# Patient Record
Sex: MALE | Race: WHITE | NOT HISPANIC OR LATINO | Employment: FULL TIME | URBAN - METROPOLITAN AREA
[De-identification: names, ages, dates, MRNs, and addresses within clinical notes are randomized per-mention and may not be internally consistent; named-entity substitution may affect disease eponyms.]

---

## 2018-07-11 ENCOUNTER — OFFICE VISIT CONVERTED (OUTPATIENT)
Dept: ONCOLOGY | Facility: HOSPITAL | Age: 53
End: 2018-07-11
Attending: INTERNAL MEDICINE

## 2018-08-30 ENCOUNTER — OFFICE VISIT CONVERTED (OUTPATIENT)
Dept: ONCOLOGY | Facility: HOSPITAL | Age: 53
End: 2018-08-30
Attending: INTERNAL MEDICINE

## 2021-05-28 VITALS
BODY MASS INDEX: 37.87 KG/M2 | SYSTOLIC BLOOD PRESSURE: 153 MMHG | HEART RATE: 61 BPM | WEIGHT: 270.5 LBS | RESPIRATION RATE: 16 BRPM | HEIGHT: 71 IN | TEMPERATURE: 97.3 F | OXYGEN SATURATION: 97 % | DIASTOLIC BLOOD PRESSURE: 94 MMHG

## 2021-05-28 VITALS
SYSTOLIC BLOOD PRESSURE: 124 MMHG | HEIGHT: 71 IN | TEMPERATURE: 98.2 F | BODY MASS INDEX: 38.58 KG/M2 | DIASTOLIC BLOOD PRESSURE: 70 MMHG | OXYGEN SATURATION: 97 % | HEART RATE: 102 BPM | WEIGHT: 275.57 LBS

## 2021-05-28 NOTE — PROGRESS NOTES
Patient: CRISTIANO ARGUETA     Acct: [id number]     Report: #[id number]  UNIT #: [id number]     : 1965    Encounter Date:2018  PRIMARY CARE: SALAZAR MORRISON  ***Signed***  --------------------------------------------------------------------------------------------------------------------  Visit Type      Established Patient Visit            Chief Complaint      COLON CA            Referring Provider/Copies To      Referring Provider:  Annette Zaldivar            Allergies      Coded Allergies:             NO KNOWN ALLERGIES (Unverified , 18)            Medications      Last Reconciled on 18 16:30 by HAKEEM ROSSI MD      Levothyroxine (Synthroid) 0.025 Mg      0.025 MG PO QDAY@07, #30 TAB 0 Refills         Reported         18       Levothyroxine (Synthroid) 0.2 Mg      0.2 MG PO QDAY@07, #30 TAB 0 Refills         Reported         18       Doxazosin Mesylate (Doxazosin Mesylate) 4 Mg Tablet      4 MG PO QDAY, TAB         Reported         18       Simvastatin (Simvastatin*) 20 Mg Tablet      20 MG PO QDAY, #30 TAB 0 Refills         Reported         18       Lisinopril* (Lisinopril*) 40 Mg Tablet      40 MG PO QDAY, #30 TAB 0 Refills         Reported         18      Medications Reviewed:  No Changes made to meds            History and Present Illness      Past Oncology Illness History      Stage 0 (Tis, N0, M0) adenocarcinoma identified in a polyp. 18 Status post     sigmoidectomy without residual cancer identified. 0/12 lymph nodes involved. No     adjuvant therapy required            HPI - Oncology Interim      Sigmoidectomy on 18 by Dr. Dolan 52-year-old white male who returns today     for follow-up of hemicolectomy. He had undergone colon cancer screening     colonoscopy which identified an abnormal polyp. Biopsy of the area did show     carcinoma invading the mucosa but not into the submucosa. Margins could not be     reliably identified as the specimen was  morselized. He was referred to     [facility] and underwent sigmoidectomy on 8/9/18 by Dr. Mccullough..     This revealed no residual carcinoma and 0/12 lymph nodes involved. No adjuvant     therapy required.      On return today, patient states that he is feeling well. He has one small area     of irritation in the umbilical scar with some slight drainage but otherwise his     surgical incisions are healing well. He denies abdominal or pelvic pain. He is     eating and drinking normally, his weight is maintained. He reports that the     bowels are back to normal. He notes that his energy level is not quite back to     normal but improving daily. He is trying to exercise some. He denies new masses     or lymphadenopathy. Overall he is feeling well.            Cancer Details            Stage 0 (Tis, N0, M0) adenocarcinoma identified in a polyp. Status post      hemicolectomy without residual cancer identified. 0/12 lymph nodes involved            PAST, FAMILY   Past Medical History      Past Medical History:  No Diabetes Type 1, No Diabetes Type 2; Thyroid Disease;     No COPD, No Emphysema; Hypertension; No Stroke; High Cholesterol; No Heart     Attack, No Bleeding Condition, No Low or High RBC Count, No Low or High WBC     Count, No Low or High Platelet Coun, No Hepatitis, No Kidney Disease, No     Depression, No Alzheimer's Disease, No Mental Disease, No Seizures, No     Arthritis, No Osteoporosis, No Osteopenia, No Short of Air, No Sleep apnea, No     Liver Disease, No STD, No Enlarged Prostate, No Other      Hematology/oncology:  REPORTS HX OF: Colorectal cancer; DENIES HX OF: Previous     Treatment for CA, Anemia, Bladder Cancer, Blood cancer, Brain cancer, Breast     cancer, Coagulopathy, Colon Cancer, Endocrine cancer, Eye cancer, GI cancer,      cancer, Kidney cancer, Leukemia, Leukocytosis, Leukopenia, Liver cancer, Lung     cancer, Lymphoma, Musculoskeletal cancer, Myeloma, Neurologic cancer,  Oral     cancer, Pancreatic Cancer, Prostate cancer, Skin cancer, Stomach cancer,     Testicular cancer, Thrombocytopenia, Thyroid cancer, Other cancer history, Other    hematologic history      Genetic/metabolic:  DENIES HX OF: Cystic fibrosis, Down syndrome, Other genetic     history, Other metabolic history            Past Surgical History      REPORTS HX OF: Biopsy, Other Past Surgical Hx (HAND, SHOULDER, AND BACK); DENIES    HX OF: Cataract extraction, Thyroid surgery, Lung biopsy, CABG surgery, Coronary    stent, Valve replacement, Appendectomy, Cholecystectomy, Splenectomy, Bladder     surgery, Nephrectomy, Joint replacement, Frature repair, Skin cancer removal,     Melanoma excision, Spinal surgery, Breast biopsy, Lumpectomy, Mastectomy,     bilateral, Mastectomy, right, Mastectomy, left, Hysterectomy, Peg Tube     Placement, VAD Placement            Family History      REPORTS HX OF: Breast cancer (GRANDMOTHER), Colorectal cancer (UNCLE)            Social History      Lives independently:  No            Tobacco Use      Tobacco status:  Current every day smoker      Smoking packs/day:  1      Quit status:  Considering quitting            Alcohol Use      Alcohol intake:  None            Substance Use      Substance use:  Denies use            REVIEW OF SYSTEMS      General:  Denies: Appetite change, Excessive sweating, Fatigue, Fever, Night     sweats, Weight gain, Weight loss, Other      Eyes:  Denies: Blurred vision, Corrective lenses, Diplopia, Eye irritation, Eye     pain, Eye redness, Spots in vision, Vision loss, Other      Ears, nose, mouth, throat:  Denies: Headache, Seizures, Visual Changes, Hearing     loss, Sinus Congestion, Hoarseness, Sore throat, Other      Cardiovascular:  Denies: Chest pain, Irregular heartbeat, Palpitations, Swollen     ankles/legs, Other      Respiratory:  Denies: Chest pain, Shortness of Air, Productive cough, Coughing     blood, Other      Gastrointestinal:  Denies:  Nausea, Vomiting, Problem swallowing, Frequent     heartburn, Constipation, Diarrhea, Tarry stools, Bloody stools, Unable to     control bowels, Other      Kidney/Bladder:  Denies: Painful Urination, Blood in Urine, Incontinence,     Frequent Urination, Decreased Urine Stream, Other      Musculoskeletal:  Denies: New Back pain, Leg Cramps, Painful Joints, Swollen     Joints, Muscle Pain, Muscle weakness, Other      Skin:  DENIES: Bruises Easily, Hair changes, Lesions/changes in moles, Nail     changes, Pigment changes, Rash, Other      Neurological:  Denies: Dizziness, Fainting, Numbness\Tingling, Paralysis, Seizur    es, Other      Psychiatric:  Complains of: AAO X 3; Denies: Anxiety, Panic attacks, Depression,    Memory loss, Other      Endocrine:  Denies Thyroid DisorderDiabetesOsteoporosisEndocrine Other      Hematologic/lymphatic:  Denies: Bruising, Bleeding, Enlarged Lymph Nodes,     Recurrent infections, Other            VITAL SIGNS,PAIN/FATIGUE SCORE      Vitals      Height 5 ft 10.59 in / 179.3 cm      Weight 270 lbs 8.071 oz / 122.7 kg      BSA 2.52 m2      BMI 38.2 kg/m2      Temperature 97.3 F / 36.28 C - Temporal      Pulse 61      Respirations 16      Blood Pressure 153/94 Sitting, Left Arm      Pulse Oximetry 97%, RM AIR            Pain Score      Experiencing any pain?:  No            Fatigue Score      Experiencing any fatigue?:  No            General Appearance:  Alert, Oriented X3, Cooperative, No acute distress      Respiratory:  CTAB, Normal Respiratory Effort      Abdomen\Gastro:  Soft, No NABS, Other (healing surgical incisions with one small    area of erythema and scant clear drainage at the umbilicus); No Masses, No     Hepatosplenomegaly      Cardio:  RRR, No Murmur, No, Peripheral Edema      Psychiatric:  Appropriate Affect, Intact Judgement            PREVENTION      Hx Influenza Vaccination:  No      Influenza Vaccine Declined:  No      2 or More Falls Past Year?:  No      Fall Past  Year with Injury?:  No      Hx Pneumococcal Vaccination:  No      Encouraged to follow-up with:  PCP regarding preventative exams.      Chart initiated by      YIMI TOM MA            IMPRESSION/PLAN      Impression      Colon carcinoma in situ (Tis, N0, M0). Status post sigmoidectomy on 8/9/18. No     adjuvant therapy required            Diagnosis      Neoplasm of colon, primary tumor staging category Tis: carcinoma in situ:     intraepithelial or invasion of lamina propria      Patient's initial pathology showed involvement of the mucosa but not into the     submucosa, formal resection failed to demonstrate any residual carcinoma and 12     lymph nodes were negative for involvement. Patient does not require adjuvant     therapy. Based on the NCCN guidelines, observation is appropriate. I will see     him back in 6 months to ensure that he is completely recuperated from his     surgery with basic lab work. He'll need colonoscopy at the 1 year anniversary of    his surgery. Additional scans or labs based on symptoms.            Patient Education      Patient Education Provided:  Yes                 Disclaimer: Converted document may not contain table formatting or lab diagrams. Please see [employer] for the authenticated document.

## 2021-05-28 NOTE — PROGRESS NOTES
Patient: CRISTIANO ARGUETA     Acct: [id number]     Report: #[id number]  UNIT #: [id number]     : 1965    Encounter Date:2018  PRIMARY CARE: SALAZAR MORRISON  ***Signed***  --------------------------------------------------------------------------------------------------------------------  NURSE INTAKE      Encounter Date      2018            Providers      Referring Provider:  Annette Zaldivar      Primary Provider:  SALAZAR MORRISON            Visit Type      New Patient Visit            Chief Complaint      COLON CANCER            Allergies      Coded Allergies:             NO KNOWN ALLERGIES (Unverified , 18)            Medications      Last Reconciled on 18 16:30 by HAKEEM ROSSI MD      Levothyroxine (Synthroid) 0.025 Mg      0.025 MG PO QDAY@07, #30 TAB 0 Refills         Reported         18       Levothyroxine (Synthroid) 0.2 Mg      0.2 MG PO QDAY@07, #30 TAB 0 Refills         Reported         18       Doxazosin Mesylate (Doxazosin Mesylate) 4 Mg Tablet      4 MG PO QDAY, TAB         Reported         18       Simvastatin (Simvastatin*) 20 Mg Tablet      20 MG PO QDAY, #30 TAB 0 Refills         Reported         18       Lisinopril* (Lisinopril*) 40 Mg Tablet      40 MG PO QDAY, #30 TAB 0 Refills         Reported         18      Medications Reviewed:  No Changes made to meds            PAST, FAMILY   Past Medical History      Past Medical History:  No Diabetes Type 1, No Diabetes Type 2, Thyroid Disease,     No COPD, No Emphysema, Hypertension, No Stroke, High Cholesterol, No Heart     Attack, No Bleeding Condition, No Low or High RBC Count, No Low or High WBC     Count, No Low or High Platelet Coun, No Hepatitis, No Kidney Disease, No     Depression, No Alzheimer's Disease, No Mental Disease, No Seizures, No Arthritis    , No Osteoporosis, No Osteopenia, No Short of Air, No Sleep apnea, No Liver     Disease, No STD, No Enlarged Prostate, No Other       Hematology/oncology:  REPORTS HX OF: Colorectal cancer, DENIES HX OF: Previous     Treatment for CA, Anemia, Bladder Cancer, Blood cancer, Brain cancer, Breast     cancer, Coagulopathy, Colon Cancer, Endocrine cancer, Eye cancer, GI cancer,      cancer, Kidney cancer, Leukemia, Leukocytosis, Leukopenia, Liver cancer, Lung     cancer, Lymphoma, Musculoskeletal cancer, Myeloma, Neurologic cancer, Oral     cancer, Pancreatic Cancer, Prostate cancer, Skin cancer, Stomach cancer,     Testicular cancer, Thrombocytopenia, Thyroid cancer, Other cancer history,     Other hematologic history      Genetic/metabolic:  DENIES HX OF: Cystic fibrosis, Down syndrome, Other genetic     history, Other metabolic history            Past Surgical History      REPORTS HX OF: Biopsy, Other Past Surgical Hx (HAND, SHOULDER, AND BACK),     DENIES HX OF: Cataract extraction, Thyroid surgery, Lung biopsy, CABG surgery,     Coronary stent, Valve replacement, Appendectomy, Cholecystectomy, Splenectomy,     Bladder surgery, Nephrectomy, Joint replacement, Frature repair, Skin cancer     removal, Melanoma excision, Spinal surgery, Breast biopsy, Lumpectomy,     Mastectomy, bilateral, Mastectomy, right, Mastectomy, left, Hysterectomy, Peg     Tube Placement, VAD Placement            Family History      REPORTS HX OF: Breast cancer (GRANDMOTHER), Colorectal cancer (UNCLE)            Social History      Lives independently:  No            Tobacco Use      Tobacco status:  Current every day smoker      Smoking packs/day:  1      Quit status:  Considering quitting            Alcohol Use      Alcohol intake:  None            Substance Use      Substance use:  Denies use            HISTORY OF PRESENT ILLNESS      History and Physical            Mr. Sr is a very pleasant 52-year-old gentleman who presented with the D     tarry stools because of which he underwent colonoscopy he on 06/25/2018. He was     found to have multiple polyps which were  removed but at 25 cm there was there     was an area of involvement with adenocarcinoma which was well differentiated     and tumor invaded the muscularis mucosae. Deep margin and mucosal margin could     not be assessed due to fragmentation of the specimen. Invasive adenocarcinoma     was arising out of a tubulovillous adenoma. Patient is now scheduled for     hemicolectomy on . In the meantime he also underwent CT scan of the     abdomen and pelvis which was unremarkable for metastatic disease. He was found     to have a mild splenomegaly most likely due to vascular involvement or     thrombosed aneurysm.            Family history is remarkable for colon cancer in fact his uncle  from     metastatic disease. His father had a brain cancer while on immunosuppressive     therapy for a heart transplant.      Patient is a  and he does smoke.            Most Recent Lab Findings      Laboratory Tests      18 13:18            REVIEW OF SYSTEMS      General:  Denies: Appetite change, Excessive sweating, Fatigue, Fever, Night     sweats, Weight gain, Weight loss, Other      Eyes:  Denies: Blurred vision, Corrective lenses, Diplopia, Eye irritation, Eye     pain, Eye redness, Spots in vision, Vision loss, Other      Ears, nose, mouth, throat:  Denies: Headache, Seizures, Visual Changes, Hearing     loss, Sinus Congestion, Hoarseness, Sore throat, Other      Cardiovascular:  Denies: Chest pain, Irregular heartbeat, Palpitations, Swollen     ankles/legs, Other      Respiratory:  Denies: Chest pain, Shortness of Air, Productive cough, Coughing     blood, Other      Gastrointestinal:  Complains of: Constipation, Tarry stools, Bloody stools,     Denies: Nausea, Vomiting, Problem swallowing, Frequent heartburn, Diarrhea,     Unable to control bowels, Other      Kidney/Bladder:  Denies: Painful Urination, Blood in Urine, Incontinence,     Frequent Urination, Decreased Urine Stream, Other       Musculoskeletal:  Denies: New Back pain, Leg Cramps, Painful Joints, Swollen     Joints, Muscle Pain, Muscle weakness, Other      Skin:  DENIES: Bruises Easily, Hair changes, Lesions/changes in moles, Nail     changes, Pigment changes, Rash, Other      Neurological:  Denies: Dizziness, Fainting, Numbness\Tingling, Paralysis,     Seizures, Other      Psychiatric:  Complains of: AAO X 3, Denies: Anxiety, Panic attacks, Depression    , Memory loss, Other      Endocrine:  DiabetesThyroid DisorderOsteoporosisEndocrine Other      Hematologic/lymphatic:  Denies: Bruising, Bleeding, Enlarged Lymph Nodes,     Recurrent infections, Other            VITAL SIGNS AND SCORES      Vitals      Height 5 ft 10.59 in / 179.3 cm      Weight 275 lbs 9.200 oz / 125.0 kg      BSA 2.55 m2      BMI 38.9 kg/m2      Temperature 98.2 F / 36.78 C - Temporal      Pulse 102      Blood Pressure 124/70 Sitting, Left Arm      Pulse Oximetry 97%, ROOM AIR            Pain Score      Pain Assessment:           Experiencing any pain?:  No         Pain Scale Used:  Numerical         Pain Intensity:  0            Fatigue Score               Experiencing any fatigue?:  No         Fatigue (0-10 scale):  0 (none)            EXAM      General Appearance:  Alert, Oriented X3, Cooperative      Eyes:  Anicteric Sclerae, Moist Conjunctiva      HEENT:  Orophraynx clear, No Erythema, No Exudates      Neck:  Supple, Full ROM      Respiratory:  CTAB, No Diminished Breath      Abdomen\Gastro:  Soft, No NABS      Cardio:  RRR, No Murmur, No, Peripheral Edema      Skin:  Normal Temperature, No Induration      Psychiatric:  Appropriate Affect, Intact Judgement, AAO x3      Muscularskeletal:  Normal Gait and Station, Full ROM of extremeties      Lymphatic:  No Cervical, No Supraclavicular, No Infraclavicular, No Axillary,     No Inguinal, No Other            PREVENTION      Hx Influenza Vaccination:  No      Influenza Vaccine Declined:  No      2 or More Falls Past  Year?:  No      Fall Past Year with Injury?:  No      Hx Pneumococcal Vaccination:  No      Encouraged to follow-up with:  PCP regarding preventative exams.      Chart initiated by      ROJELIO HOFFMAN CMA            IMPRESSION/PLAN      Current Plan            Mr. Sr is a very pleasant 52-year-old gentleman who was found to have     adenocarcinoma of the colon many underwent polypectomy of a tubulovillous     adenoma. Patient is already scheduled for partial colectomy. At the moment     there is no evidence of metastatic disease on the CT scan of the abdomen and     pelvis. Staging of cancer with possible lymph node involvement and the need for     adjuvant chemotherapy was discussed with the patient. He was advised on     quitting his smoking and changes in diet and have recommended more fruits and     vegetable consumption and less red meat and fatty consumption. I have also     recommended to him for his daughter to have early colonoscopy at age 45 because     of the significant family history.       Patient had number of questions which were answered to his satisfaction. We     will now see him back after surgery for definitive discussion regarding     adjuvant therapy.            Plan      Colon adenocarcinoma - C18.9            Notes      New Diagnostics      * CBC, Stat       Dx: Colon adenocarcinoma - C18.9      * Comp Metabolic Panel, Stat      * Cea/Carcinoembryonic, As Soon As Possible       Dx: Colon adenocarcinoma - C18.9            Patient Education      Patient Education Provided:  Yes                 Disclaimer: Converted document may not contain table formatting or lab diagrams. Please see [employer] for the authenticated document.

## 2022-01-17 ENCOUNTER — TELEPHONE (OUTPATIENT)
Dept: GASTROENTEROLOGY | Facility: CLINIC | Age: 57
End: 2022-01-17

## 2022-01-17 ENCOUNTER — PREP FOR SURGERY (OUTPATIENT)
Dept: OTHER | Facility: HOSPITAL | Age: 57
End: 2022-01-17

## 2022-01-17 ENCOUNTER — CLINICAL SUPPORT (OUTPATIENT)
Dept: GASTROENTEROLOGY | Facility: CLINIC | Age: 57
End: 2022-01-17

## 2022-01-17 DIAGNOSIS — Z12.11 COLON CANCER SCREENING: Primary | ICD-10-CM

## 2022-01-17 RX ORDER — SODIUM, POTASSIUM,MAG SULFATES 17.5-3.13G
2 SOLUTION, RECONSTITUTED, ORAL ORAL TAKE AS DIRECTED
Qty: 354 ML | Refills: 0 | Status: ON HOLD | OUTPATIENT
Start: 2022-01-17 | End: 2022-04-25

## 2022-01-17 RX ORDER — LEVOTHYROXINE SODIUM 0.07 MG/1
100 TABLET ORAL DAILY
COMMUNITY

## 2022-01-17 RX ORDER — LISINOPRIL 40 MG/1
40 TABLET ORAL DAILY
COMMUNITY

## 2022-01-17 NOTE — PROGRESS NOTES
SPOKE WITH PT ON A DATE FOR COLONOSCOPY OF 4/25/22 . MADE SURE CHART WAS UP TO DATE. WENT OVER PREP AND MAILED OUT INSTRUCTIONS. PUT IN ORDER FOR COLON AND SENT PREP TO PHARMACY

## 2022-01-17 NOTE — TELEPHONE ENCOUNTER
Mingo Sr  REASON FOR CALL encounter for colon screening  SENT IN PREP suprep  Past Medical History:   Diagnosis Date   • Hypertension      No Known Allergies  Past Surgical History:   Procedure Laterality Date   • COLONOSCOPY      [facility]     Social History     Socioeconomic History   • Marital status:    Tobacco Use   • Smoking status: Never Smoker   • Smokeless tobacco: Never Used   Vaping Use   • Vaping Use: Never used   Substance and Sexual Activity   • Alcohol use: Never   • Drug use: Defer   • Sexual activity: Defer     No family history on file.    Current Outpatient Medications:   •  levothyroxine (SYNTHROID, LEVOTHROID) 75 MCG tablet, Take 100 mcg by mouth Daily., Disp: , Rfl:   •  lisinopril (PRINIVIL,ZESTRIL) 40 MG tablet, Take 40 mg by mouth Daily., Disp: , Rfl:

## 2022-01-26 ENCOUNTER — PATIENT ROUNDING (BHMG ONLY) (OUTPATIENT)
Dept: GASTROENTEROLOGY | Facility: CLINIC | Age: 57
End: 2022-01-26

## 2022-01-26 NOTE — PROGRESS NOTES
January 26, 2022    Hello, may I speak with Mingo Sr?    My name is Charo GLASS    I am  with [facility]  [address] [place] [address] [phone].    Before we get started may I verify your date of birth? 1965    I am calling to officially welcome you to our practice and ask about your recent visit. Is this a good time to talk? Yes     Tell me about your visit with us. What things went well?  Everything went well, very pleasant and helpful     We're always looking for ways to make our patients' experiences even better. Do you have recommendations on ways we may improve? No    Overall were you satisfied with your first visit to our practice? Yes        I appreciate you taking the time to speak with me today. Is there anything else I can do for you? No      Thank you, and have a great day.

## 2022-04-18 ENCOUNTER — TELEPHONE (OUTPATIENT)
Dept: GASTROENTEROLOGY | Facility: CLINIC | Age: 57
End: 2022-04-18

## 2022-04-21 RX ORDER — ATORVASTATIN CALCIUM 20 MG/1
20 TABLET, FILM COATED ORAL DAILY
COMMUNITY
Start: 2022-03-29

## 2022-04-21 RX ORDER — ALLOPURINOL 300 MG/1
300 TABLET ORAL DAILY
COMMUNITY
Start: 2022-04-12

## 2022-04-21 RX ORDER — AMLODIPINE BESYLATE 10 MG/1
10 TABLET ORAL DAILY
COMMUNITY
Start: 2022-03-29

## 2022-04-21 RX ORDER — LEVOTHYROXINE SODIUM 137 UG/1
137 TABLET ORAL DAILY
COMMUNITY
Start: 2022-03-29

## 2022-04-25 ENCOUNTER — HOSPITAL ENCOUNTER (OUTPATIENT)
Facility: HOSPITAL | Age: 57
Setting detail: HOSPITAL OUTPATIENT SURGERY
Discharge: HOME | End: 2022-04-25
Attending: INTERNAL MEDICINE | Admitting: INTERNAL MEDICINE

## 2022-04-25 ENCOUNTER — ANESTHESIA (OUTPATIENT)
Dept: GASTROENTEROLOGY | Facility: HOSPITAL | Age: 57
End: 2022-04-25

## 2022-04-25 ENCOUNTER — ANESTHESIA EVENT (OUTPATIENT)
Dept: GASTROENTEROLOGY | Facility: HOSPITAL | Age: 57
End: 2022-04-25

## 2022-04-25 VITALS
SYSTOLIC BLOOD PRESSURE: 116 MMHG | WEIGHT: 248.24 LBS | HEIGHT: 72 IN | BODY MASS INDEX: 33.62 KG/M2 | RESPIRATION RATE: 16 BRPM | DIASTOLIC BLOOD PRESSURE: 84 MMHG | OXYGEN SATURATION: 94 % | HEART RATE: 67 BPM | TEMPERATURE: 97 F

## 2022-04-25 DIAGNOSIS — Z12.11 COLON CANCER SCREENING: ICD-10-CM

## 2022-04-25 PROCEDURE — 45385 COLONOSCOPY W/LESION REMOVAL: CPT | Performed by: INTERNAL MEDICINE

## 2022-04-25 PROCEDURE — 88305 TISSUE EXAM BY PATHOLOGIST: CPT | Performed by: INTERNAL MEDICINE

## 2022-04-25 PROCEDURE — 25010000002 PROPOFOL 10 MG/ML EMULSION

## 2022-04-25 RX ORDER — TAMSULOSIN HYDROCHLORIDE 0.4 MG/1
1 CAPSULE ORAL DAILY
COMMUNITY

## 2022-04-25 RX ORDER — SODIUM CHLORIDE, SODIUM LACTATE, POTASSIUM CHLORIDE, CALCIUM CHLORIDE 600; 310; 30; 20 MG/100ML; MG/100ML; MG/100ML; MG/100ML
30 INJECTION, SOLUTION INTRAVENOUS CONTINUOUS
Status: DISCONTINUED | OUTPATIENT
Start: 2022-04-25 | End: 2022-04-25 | Stop reason: HOSPADM

## 2022-04-25 RX ORDER — LIDOCAINE HYDROCHLORIDE 20 MG/ML
INJECTION, SOLUTION EPIDURAL; INFILTRATION; INTRACAUDAL; PERINEURAL AS NEEDED
Status: DISCONTINUED | OUTPATIENT
Start: 2022-04-25 | End: 2022-04-25 | Stop reason: SURG

## 2022-04-25 RX ORDER — PROPOFOL 10 MG/ML
VIAL (ML) INTRAVENOUS AS NEEDED
Status: DISCONTINUED | OUTPATIENT
Start: 2022-04-25 | End: 2022-04-25 | Stop reason: SURG

## 2022-04-25 RX ADMIN — PROPOFOL 200 MCG/KG/MIN: 10 INJECTION, EMULSION INTRAVENOUS at 08:00

## 2022-04-25 RX ADMIN — LIDOCAINE HYDROCHLORIDE 100 MG: 20 INJECTION, SOLUTION EPIDURAL; INFILTRATION; INTRACAUDAL; PERINEURAL at 08:00

## 2022-04-25 RX ADMIN — PROPOFOL 100 MG: 10 INJECTION, EMULSION INTRAVENOUS at 08:00

## 2022-04-25 RX ADMIN — SODIUM CHLORIDE, POTASSIUM CHLORIDE, SODIUM LACTATE AND CALCIUM CHLORIDE 30 ML/HR: 600; 310; 30; 20 INJECTION, SOLUTION INTRAVENOUS at 07:31

## 2022-04-25 NOTE — H&P
"Pre Procedure History & Physical    Chief Complaint:   Screening colonoscopy    Subjective     HPI:   55 yo M here for screening colonoscopy.    Past Medical History:   Past Medical History:   Diagnosis Date   • Arthritis    • Cancer (HCC)     POLYP COLON   • Disease of thyroid gland    • Gout    • Hyperlipidemia    • Hypertension        Past Surgical History:  Past Surgical History:   Procedure Laterality Date   • COLONOSCOPY      [facility]       Family History:  History reviewed. No pertinent family history.    Social History:   does not have a smoking history on file. He has never used smokeless tobacco. Drug use questions deferred to the physician. He reports that he does not drink alcohol.    Medications:   Medications Prior to Admission   Medication Sig Dispense Refill Last Dose   • lisinopril (PRINIVIL,ZESTRIL) 40 MG tablet Take 40 mg by mouth Daily.   4/21/2022 at Unknown time   • allopurinol (ZYLOPRIM) 300 MG tablet Take 300 mg by mouth Daily. for gout prevention      • amLODIPine (NORVASC) 10 MG tablet Take 10 mg by mouth Daily.      • atorvastatin (LIPITOR) 20 MG tablet Take 20 mg by mouth Daily.      • levothyroxine (SYNTHROID, LEVOTHROID) 137 MCG tablet Take 137 mcg by mouth Daily.      • levothyroxine (SYNTHROID, LEVOTHROID) 75 MCG tablet Take 100 mcg by mouth Daily.      • sodium-potassium-magnesium sulfates (Suprep Bowel Prep Kit) 17.5-3.13-1.6 GM/177ML solution oral solution Take 2 bottles by mouth Take As Directed. Take as directed by prescriber's office 354 mL 0        Allergies:  Patient has no known allergies.    ROS:    Pertinent items are noted in HPI     Objective     Height 182.9 cm (72\"), weight 113 kg (248 lb 3.8 oz).    Physical Exam   Constitutional: Pt is oriented to person, place, and time and well-developed, well-nourished, and in no distress.   Mouth/Throat: Oropharynx is clear and moist.   Neck: Normal range of motion.   Cardiovascular: Normal rate, regular rhythm and normal heart sounds. "    Pulmonary/Chest: Effort normal and breath sounds normal.   Abdominal: Soft. Nontender  Skin: Skin is warm and dry.   Psychiatric: Mood, memory, affect and judgment normal.     Assessment/Plan     Diagnosis:  Screening colonoscopy    Anticipated Surgical Procedure:  Colonoscopy    The risks, benefits, and alternatives of this procedure have been discussed with the patient or the responsible party- the patient understands and agrees to proceed.

## 2022-04-25 NOTE — ANESTHESIA PREPROCEDURE EVALUATION
Anesthesia Evaluation     Patient summary reviewed and Nursing notes reviewed   no history of anesthetic complications:  NPO Solid Status: > 8 hours  NPO Liquid Status: > 2 hours           Airway   Mallampati: II  TM distance: >3 FB  Neck ROM: full  No difficulty expected  Dental      Pulmonary - normal exam    breath sounds clear to auscultation  (+) a smoker Current Smoked day of surgery,   Cardiovascular - normal exam  Exercise tolerance: good (4-7 METS)    Rhythm: regular  Rate: normal    (+) hypertension, hyperlipidemia,       Neuro/Psych- negative ROS  GI/Hepatic/Renal/Endo    (+)   thyroid problem hypothyroidism    Musculoskeletal     Abdominal    Substance History - negative use     OB/GYN negative ob/gyn ROS         Other   arthritis,    history of cancer                    Anesthesia Plan    ASA 2     general and MAC   (Patient understands anesthesia not responsible for dental damage.)  intravenous induction     Anesthetic plan, all risks, benefits, and alternatives have been provided, discussed and informed consent has been obtained with: patient.  Use of blood products discussed with patient .   Plan discussed with CRNA.        CODE STATUS:

## 2022-04-25 NOTE — ANESTHESIA POSTPROCEDURE EVALUATION
Patient: Mingo Sr    Procedure Summary     Date: 04/25/22 Room / Location: [facility] ENDOSCOPY 1 / [facility] ENDOSCOPY    Anesthesia Start: 0758 Anesthesia Stop: 0827    Procedure: COLONOSCOPY WITH COLD SNARE (N/A ) Diagnosis:       Colon cancer screening      (Colon cancer screening [Z12.11])    Surgeons: Annette Zaldivar MD Provider: Nikki Lange MD    Anesthesia Type: general, MAC ASA Status: 2          Anesthesia Type: general, MAC    Vitals  Vitals Value Taken Time   /84 04/25/22 0848   Temp 36.1 °C (97 °F) 04/25/22 0845   Pulse 68 04/25/22 0847   Resp 16 04/25/22 0845   SpO2 94 % 04/25/22 0847   Vitals shown include unvalidated device data.        Post Anesthesia Care and Evaluation    Patient location during evaluation: bedside  Patient participation: complete - patient participated  Level of consciousness: awake  Pain score: 0  Pain management: adequate  Airway patency: patent  Anesthetic complications: No anesthetic complications  PONV Status: none  Cardiovascular status: acceptable and stable  Respiratory status: acceptable and room air  Hydration status: acceptable    Comments: An Anesthesiologist personally participated in the most demanding procedures (including induction and emergence if applicable) in the anesthesia plan, monitored the course of anesthesia administration at frequent intervals and remained physically present and available for immediate diagnosis and treatment of emergencies.

## 2022-04-26 LAB
CYTO UR: NORMAL
LAB AP CASE REPORT: NORMAL
LAB AP CLINICAL INFORMATION: NORMAL
PATH REPORT.FINAL DX SPEC: NORMAL
PATH REPORT.GROSS SPEC: NORMAL

## 2022-05-04 ENCOUNTER — TELEPHONE (OUTPATIENT)
Dept: GASTROENTEROLOGY | Facility: CLINIC | Age: 57
End: 2022-05-04

## 2022-05-04 NOTE — TELEPHONE ENCOUNTER
----- Message from MELISSA Aguilar sent at 4/28/2022  8:56 AM EDT -----  Colon polyp hyperplastic which means benign.  Please place patient: Recall for 5 years.  Patient to follow-up as needed.

## (undated) DEVICE — Device: Brand: DEFENDO AIR/WATER/SUCTION AND BIOPSY VALVE

## (undated) DEVICE — COLON KIT: Brand: MEDLINE INDUSTRIES, INC.

## (undated) DEVICE — SOL IRRG H2O PL/BG 1000ML STRL